# Patient Record
Sex: MALE | Race: WHITE | HISPANIC OR LATINO | Employment: PART TIME | URBAN - METROPOLITAN AREA
[De-identification: names, ages, dates, MRNs, and addresses within clinical notes are randomized per-mention and may not be internally consistent; named-entity substitution may affect disease eponyms.]

---

## 2022-10-26 ENCOUNTER — APPOINTMENT (OUTPATIENT)
Dept: GENERAL RADIOLOGY | Facility: HOSPITAL | Age: 22
End: 2022-10-26

## 2022-10-26 ENCOUNTER — HOSPITAL ENCOUNTER (OUTPATIENT)
Facility: HOSPITAL | Age: 22
Discharge: HOME | End: 2022-10-26
Attending: EMERGENCY MEDICINE

## 2022-10-26 VITALS
OXYGEN SATURATION: 98 % | BODY MASS INDEX: 28.63 KG/M2 | SYSTOLIC BLOOD PRESSURE: 96 MMHG | DIASTOLIC BLOOD PRESSURE: 69 MMHG | HEIGHT: 70 IN | HEART RATE: 81 BPM | RESPIRATION RATE: 18 BRPM | WEIGHT: 200 LBS | TEMPERATURE: 97.8 F

## 2022-10-26 DIAGNOSIS — S93.402A SPRAIN OF LEFT ANKLE, UNSPECIFIED LIGAMENT, INITIAL ENCOUNTER: Primary | ICD-10-CM

## 2022-10-26 PROCEDURE — G0463 HOSPITAL OUTPT CLINIC VISIT: HCPCS | Performed by: EMERGENCY MEDICINE

## 2022-10-26 PROCEDURE — 99203 OFFICE O/P NEW LOW 30 MIN: CPT | Performed by: EMERGENCY MEDICINE

## 2022-10-26 PROCEDURE — 73610 X-RAY EXAM OF ANKLE: CPT | Performed by: EMERGENCY MEDICINE

## 2022-10-26 PROCEDURE — 73610 X-RAY EXAM OF ANKLE: CPT

## 2022-10-26 PROCEDURE — EDLOS: Performed by: EMERGENCY MEDICINE

## 2022-10-26 NOTE — ED PROVIDER NOTES
Subjective   History of Present Illness  The patient is a 22-year-old male with no significant past medical history, who presents emergency room with complaints of a left ankle injury.  He sustained his injury today at 3:00, 3 hours ago.  Patient states that he missed stepped and twisted his ankle.  Patient reports pain to the lateral aspect of his ankle, radiating down into his foot with associated swelling.  He describes the pain as aching, moderate in intensity, worse with weightbearing and walking.  He is here for an evaluation.        Review of Systems   Constitutional: Negative.  Negative for chills, fatigue and fever.   Eyes: Negative.    Respiratory: Negative for cough, chest tightness and shortness of breath.    Cardiovascular: Negative for chest pain and palpitations.   Gastrointestinal: Negative for abdominal pain, diarrhea, nausea and vomiting.   Genitourinary: Negative.    Musculoskeletal: Positive for arthralgias and joint swelling.   Skin: Negative.  Negative for rash.   Neurological: Negative.  Negative for syncope, weakness, numbness and headaches.   Psychiatric/Behavioral: Negative.    All other systems reviewed and are negative.      History reviewed. No pertinent past medical history.    No Known Allergies    History reviewed. No pertinent surgical history.    History reviewed. No pertinent family history.    Social History     Tobacco Use   • Smoking status: Never   • Smokeless tobacco: Never   Substance and Sexual Activity   • Alcohol use: Never           Objective   Physical Exam  Vitals and nursing note reviewed.   Constitutional:       General: He is not in acute distress.     Appearance: He is normal weight. He is not ill-appearing.      Comments: The following exam was performed from a distance of 6 feet.  The patient had presumed or suspected upper respiratory infection that may be COVID-19.  The patient was in a negative pressure room if possible.  The provider as well as the patient  and/or family members were asked to wear a mask when possible.   HENT:      Head: Normocephalic and atraumatic.      Right Ear: External ear normal.      Left Ear: External ear normal.      Nose: Nose normal.   Eyes:      Extraocular Movements: Extraocular movements intact.      Conjunctiva/sclera: Conjunctivae normal.      Pupils: Pupils are equal, round, and reactive to light.   Cardiovascular:      Rate and Rhythm: Normal rate and regular rhythm.      Comments: Per the bedside cardiac monitor  Pulmonary:      Effort: Pulmonary effort is normal. No respiratory distress.      Comments: Patient's oxygen saturation was greater than 90% per the bedside pulse oximetry.  There were NO audible abnormal breath sounds present.  Abdominal:      General: Abdomen is flat. There is no distension.   Musculoskeletal:         General: Swelling, deformity and signs of injury present.      Cervical back: Normal range of motion and neck supple.      Right ankle: Normal.      Left ankle: Swelling present. Tenderness present. Decreased range of motion. Anterior drawer test negative.      Left Achilles Tendon: Normal. No tenderness or defects. Aguilera's test negative.   Skin:     General: Skin is warm.      Capillary Refill: Capillary refill takes less than 2 seconds.      Findings: No erythema.   Neurological:      General: No focal deficit present.      Mental Status: He is alert and oriented to person, place, and time. Mental status is at baseline.   Psychiatric:         Mood and Affect: Mood normal.         Procedures           ED Course                                           MDM    Final diagnoses:   Sprain of left ankle, unspecified ligament, initial encounter       ED Disposition  ED Disposition     ED Disposition   Discharge    Condition   Stable    Comment   --             Hector Pate MD  [address] [address]  [phone]    Schedule an appointment as soon as possible for a visit in 3  days  For repeat evaluation         Medication List      No changes were made to your prescriptions during this visit.          Dimitri Tao MD  10/26/22 1837       Dimitri Tao MD  10/26/22 1839

## 2022-10-26 NOTE — ED TRIAGE NOTES
Pt presents with c/o L ankle injury after stepping down wrong. Non-weight bearing  per patient report.

## 2022-10-26 NOTE — DISCHARGE INSTRUCTIONS
Nonweightbearing on affected ankle for 2 days, then weightbearing as tolerated after that with a Aircast in place.  Rest, ice and elevate ankle.  Take over-the-counter ibuprofen every 8 hours as needed for mild to moderate pain.  If you were prescribed a stronger pain medication, please take that for severe pain.  Follow-up with orthopedic surgery in 3 days for repeat evaluation.  Return to ER for any concerns.